# Patient Record
Sex: FEMALE | Race: WHITE | Employment: OTHER | URBAN - METROPOLITAN AREA
[De-identification: names, ages, dates, MRNs, and addresses within clinical notes are randomized per-mention and may not be internally consistent; named-entity substitution may affect disease eponyms.]

---

## 2017-03-13 ENCOUNTER — OFFICE VISIT (OUTPATIENT)
Dept: FAMILY MEDICINE CLINIC | Age: 82
End: 2017-03-13
Payer: MEDICARE

## 2017-03-13 VITALS
HEART RATE: 76 BPM | SYSTOLIC BLOOD PRESSURE: 110 MMHG | HEIGHT: 61 IN | DIASTOLIC BLOOD PRESSURE: 62 MMHG | WEIGHT: 107 LBS | BODY MASS INDEX: 20.2 KG/M2

## 2017-03-13 DIAGNOSIS — H61.21 HEARING LOSS DUE TO CERUMEN IMPACTION, RIGHT: ICD-10-CM

## 2017-03-13 DIAGNOSIS — I10 ESSENTIAL HYPERTENSION: Primary | ICD-10-CM

## 2017-03-13 PROCEDURE — G8484 FLU IMMUNIZE NO ADMIN: HCPCS | Performed by: FAMILY MEDICINE

## 2017-03-13 PROCEDURE — 1036F TOBACCO NON-USER: CPT | Performed by: FAMILY MEDICINE

## 2017-03-13 PROCEDURE — G8427 DOCREV CUR MEDS BY ELIG CLIN: HCPCS | Performed by: FAMILY MEDICINE

## 2017-03-13 PROCEDURE — G8419 CALC BMI OUT NRM PARAM NOF/U: HCPCS | Performed by: FAMILY MEDICINE

## 2017-03-13 PROCEDURE — 99213 OFFICE O/P EST LOW 20 MIN: CPT | Performed by: FAMILY MEDICINE

## 2017-03-13 PROCEDURE — G8400 PT W/DXA NO RESULTS DOC: HCPCS | Performed by: FAMILY MEDICINE

## 2017-03-13 PROCEDURE — 1123F ACP DISCUSS/DSCN MKR DOCD: CPT | Performed by: FAMILY MEDICINE

## 2017-03-13 PROCEDURE — 69210 REMOVE IMPACTED EAR WAX UNI: CPT | Performed by: FAMILY MEDICINE

## 2017-03-13 PROCEDURE — 4040F PNEUMOC VAC/ADMIN/RCVD: CPT | Performed by: FAMILY MEDICINE

## 2017-03-13 PROCEDURE — 1090F PRES/ABSN URINE INCON ASSESS: CPT | Performed by: FAMILY MEDICINE

## 2017-03-13 RX ORDER — AMLODIPINE BESYLATE 5 MG/1
TABLET ORAL
Qty: 90 TABLET | Refills: 1 | Status: SHIPPED | OUTPATIENT
Start: 2017-03-13 | End: 2017-08-08 | Stop reason: SDUPTHER

## 2017-03-13 ASSESSMENT — ENCOUNTER SYMPTOMS
COUGH: 0
RHINORRHEA: 0
DIARRHEA: 0
CONSTIPATION: 0
SHORTNESS OF BREATH: 0
ABDOMINAL PAIN: 0
VOMITING: 0
WHEEZING: 0
BLOOD IN STOOL: 0
SORE THROAT: 0
NAUSEA: 0
TROUBLE SWALLOWING: 0

## 2017-08-08 ENCOUNTER — OFFICE VISIT (OUTPATIENT)
Dept: FAMILY MEDICINE CLINIC | Age: 82
End: 2017-08-08
Payer: MEDICARE

## 2017-08-08 VITALS
OXYGEN SATURATION: 97 % | HEIGHT: 61 IN | WEIGHT: 110 LBS | DIASTOLIC BLOOD PRESSURE: 68 MMHG | HEART RATE: 80 BPM | SYSTOLIC BLOOD PRESSURE: 105 MMHG | BODY MASS INDEX: 20.77 KG/M2 | RESPIRATION RATE: 18 BRPM

## 2017-08-08 DIAGNOSIS — I10 ESSENTIAL HYPERTENSION: Primary | ICD-10-CM

## 2017-08-08 PROCEDURE — 1090F PRES/ABSN URINE INCON ASSESS: CPT | Performed by: NURSE PRACTITIONER

## 2017-08-08 PROCEDURE — 1036F TOBACCO NON-USER: CPT | Performed by: NURSE PRACTITIONER

## 2017-08-08 PROCEDURE — G8400 PT W/DXA NO RESULTS DOC: HCPCS | Performed by: NURSE PRACTITIONER

## 2017-08-08 PROCEDURE — G8427 DOCREV CUR MEDS BY ELIG CLIN: HCPCS | Performed by: NURSE PRACTITIONER

## 2017-08-08 PROCEDURE — 4040F PNEUMOC VAC/ADMIN/RCVD: CPT | Performed by: NURSE PRACTITIONER

## 2017-08-08 PROCEDURE — 1123F ACP DISCUSS/DSCN MKR DOCD: CPT | Performed by: NURSE PRACTITIONER

## 2017-08-08 PROCEDURE — G8420 CALC BMI NORM PARAMETERS: HCPCS | Performed by: NURSE PRACTITIONER

## 2017-08-08 PROCEDURE — 99213 OFFICE O/P EST LOW 20 MIN: CPT | Performed by: NURSE PRACTITIONER

## 2017-08-08 RX ORDER — AMLODIPINE BESYLATE 5 MG/1
TABLET ORAL
Qty: 90 TABLET | Refills: 1 | Status: SHIPPED | OUTPATIENT
Start: 2017-08-08 | End: 2018-03-10 | Stop reason: SDUPTHER

## 2017-08-08 ASSESSMENT — ENCOUNTER SYMPTOMS
CONSTIPATION: 0
DIARRHEA: 0
SINUS PRESSURE: 0
BLOOD IN STOOL: 0
ABDOMINAL PAIN: 0
RHINORRHEA: 0
COUGH: 0
SHORTNESS OF BREATH: 0
EYE DISCHARGE: 0
CHEST TIGHTNESS: 0

## 2017-08-08 ASSESSMENT — PATIENT HEALTH QUESTIONNAIRE - PHQ9
SUM OF ALL RESPONSES TO PHQ9 QUESTIONS 1 & 2: 0
SUM OF ALL RESPONSES TO PHQ QUESTIONS 1-9: 0
2. FEELING DOWN, DEPRESSED OR HOPELESS: 0
1. LITTLE INTEREST OR PLEASURE IN DOING THINGS: 0

## 2017-11-20 ENCOUNTER — OFFICE VISIT (OUTPATIENT)
Dept: FAMILY MEDICINE CLINIC | Age: 82
End: 2017-11-20
Payer: MEDICARE

## 2017-11-20 VITALS
OXYGEN SATURATION: 98 % | HEART RATE: 76 BPM | HEIGHT: 61 IN | DIASTOLIC BLOOD PRESSURE: 78 MMHG | WEIGHT: 108 LBS | SYSTOLIC BLOOD PRESSURE: 130 MMHG | TEMPERATURE: 97 F | BODY MASS INDEX: 20.39 KG/M2 | RESPIRATION RATE: 20 BRPM

## 2017-11-20 DIAGNOSIS — Z00.00 ROUTINE GENERAL MEDICAL EXAMINATION AT A HEALTH CARE FACILITY: Primary | ICD-10-CM

## 2017-11-20 PROCEDURE — G0438 PPPS, INITIAL VISIT: HCPCS | Performed by: NURSE PRACTITIONER

## 2017-11-20 ASSESSMENT — LIFESTYLE VARIABLES: HOW OFTEN DO YOU HAVE A DRINK CONTAINING ALCOHOL: 0

## 2017-11-20 ASSESSMENT — PATIENT HEALTH QUESTIONNAIRE - PHQ9: SUM OF ALL RESPONSES TO PHQ QUESTIONS 1-9: 0

## 2017-11-20 ASSESSMENT — ANXIETY QUESTIONNAIRES: GAD7 TOTAL SCORE: 0

## 2017-11-20 NOTE — PATIENT INSTRUCTIONS
Personalized Preventive Plan for Adilia Gunn - 11/20/2017  Medicare offers a range of preventive health benefits. Some of the tests and screenings are paid in full while other may be subject to a deductible, co-insurance, and/or copay. Some of these benefits include a comprehensive review of your medical history including lifestyle, illnesses that may run in your family, and various assessments and screenings as appropriate. After reviewing your medical record and screening and assessments performed today your provider may have ordered immunizations, labs, imaging, and/or referrals for you. A list of these orders (if applicable) as well as your Preventive Care list are included within your After Visit Summary for your review. Other Preventive Recommendations:    · A preventive eye exam performed by an eye specialist is recommended every 1-2 years to screen for glaucoma; cataracts, macular degeneration, and other eye disorders. · A preventive dental visit is recommended every 6 months. · Try to get at least 150 minutes of exercise per week or 10,000 steps per day on a pedometer . · Order or download the FREE \"Exercise & Physical Activity: Your Everyday Guide\" from [employer]. Call 5-958.457.5185 or search [employer] online. · You need 9716-6208 mg of calcium and 3051-7387 IU of vitamin D per day. It is possible to meet your calcium requirement with diet alone, but a vitamin D supplement is usually necessary to meet this goal.  · When exposed to the sun, use a sunscreen that protects against both UVA and UVB radiation with an SPF of 30 or greater. Reapply every 2 to 3 hours or after sweating, drying off with a towel, or swimming. · Always wear a seat belt when traveling in a car. Always wear a helmet when riding a bicycle or motorcycle. Personalized Preventive Plan for Adilia Gunn - 11/20/2017  Medicare offers a range of preventive health benefits. Some of the tests and screenings are paid in full while other may be subject to a deductible, co-insurance, and/or copay. Some of these benefits include a comprehensive review of your medical history including lifestyle, illnesses that may run in your family, and various assessments and screenings as appropriate. After reviewing your medical record and screening and assessments performed today your provider may have ordered immunizations, labs, imaging, and/or referrals for you. A list of these orders (if applicable) as well as your Preventive Care list are included within your After Visit Summary for your review. Other Preventive Recommendations:    · A preventive eye exam performed by an eye specialist is recommended every 1-2 years to screen for glaucoma; cataracts, macular degeneration, and other eye disorders. · A preventive dental visit is recommended every 6 months. · Try to get at least 150 minutes of exercise per week or 10,000 steps per day on a pedometer . · Order or download the FREE \"Exercise & Physical Activity: Your Everyday Guide\" from [employer]. Call 6-700.878.2919 or search [employer] online. · You need 1906-0048 mg of calcium and 3427-6552 IU of vitamin D per day. It is possible to meet your calcium requirement with diet alone, but a vitamin D supplement is usually necessary to meet this goal.  · When exposed to the sun, use a sunscreen that protects against both UVA and UVB radiation with an SPF of 30 or greater. Reapply every 2 to 3 hours or after sweating, drying off with a towel, or swimming. · Always wear a seat belt when traveling in a car. Always wear a helmet when riding a bicycle or motorcycle. Personalized Preventive Plan for Khadar Sheth - 11/20/2017  Medicare offers a range of preventive health benefits.  Some of the tests and screenings are paid in full while other may be subject to a deductible, co-insurance, and/or

## 2017-11-20 NOTE — PROGRESS NOTES
Medicare Annual Wellness Visit  Name: Solomon Antoine Date: 2017   MRN: [id number] Sex: Female   Age: 81 y.o. Ethnicity: Non-/Non    : 1935 Race: Portia Bello is here for Medicare AWV    Screenings for behavioral, psychosocial and functional/safety risks, and cognitive dysfunction are all negative except as indicated below. These results, as well as other patient data from the [address] form, are documented in Flowsheets linked to this Encounter. No Known Allergies    Prior to Visit Medications    Medication Sig Taking? Authorizing Provider   amLODIPine (NORVASC) 5 MG tablet take 1 tablet by mouth once daily Yes Jose Trujillo CNP   timolol (TIMOPTIC) 0.5 % ophthalmic solution As directed Yes Yary Hernandez DO       Past Medical History:   Diagnosis Date    Hypertension     Unspecified essential hypertension 10/5/2012     History reviewed. No pertinent surgical history. Family History   Problem Relation Age of Onset    Heart Disease Mother     Heart Disease Father     Mult Sclerosis Sister        CareTeam (Including outside providers/suppliers regularly involved in providing care):   Patient Care Team:  Jose Trujillo CNP as PCP - General (Certified Nurse Practitioner)  Jose Trujillo CNP as PCP - [facility] Attributed Provider    Wt Readings from Last 3 Encounters:   17 108 lb (49 kg)   17 110 lb (49.9 kg)   17 107 lb (48.5 kg)     Vitals:    17 0856   BP: 130/78   Site: Left Arm   Position: Sitting   Cuff Size: Small Adult   Pulse: 76   Resp: 20   Temp: 97 °F (36.1 °C)   TempSrc: Oral   SpO2: 98%   Weight: 108 lb (49 kg)   Height: 5' 1.02\" (1.55 m)       Patient's complete Health Risk Assessment and screening values have been reviewed and are found in Flowsheets. The following problems were reviewed today and where indicated follow up appointments were made and/or referrals ordered.     Positive Risk Factor Screenings with Interventions:     Health Habits/Nutrition:  Health Habits/Nutrition  Do you exercise for at least 20 minutes 2-3 times per week?: (!) No  Have you lost any weight without trying in the past 3 months?: No  Do you eat fewer than 2 meals per day?: No  Have you seen a dentist within the past year?: Yes  Body mass index is 20.39 kg/m². Health Habits/Nutrition Interventions:  · None indicated   · Increase activity      Personalized Preventive Plan   Current Health Maintenance Status    There is no immunization history on file for this patient. Refuses any immunizations at this time    Health Maintenance   Topic Date Due    Pneumococcal low/med risk (1 of 2 - PCV13) 12/12/2018 (Originally 4/29/2001)    DTaP/Tdap/Td vaccine (1 - Tdap) 09/09/2026 (Originally 4/29/1955)    Flu vaccine (1) 09/09/2026 (Originally 9/1/2017)    Zostavax vaccine  Addressed    DEXA (modify frequency per FRAX score)  Addressed     Recommendations for Preventive Services Due: see orders. Recommended screening schedule for the next 5-10 years is provided to the patient in written form: see Patient Instructions/AVS.  Cardiovascular Disease Risk Counseling: Assessed the patient's risk to develop cardiovascular disease and reviewed main risk factors. Reviewed steps to reduce disease risk including:   · Quitting tobacco use, reducing amount smoked, or not starting the habit  · Making healthy food choices  · Being physically active and gradualy increasing activity levels   · Reduce weight and determine a healthy BMI goal  · Monitor blood pressure and treat if higher than 140/90 mmHg  · Maintain blood total cholesterol levels under 5 mmol/l or 190 mg/dl  · Maintain LDL cholesterol levels under 3.0 mmol/l or 115 mg/dl   · Control blood glucose levels  · Consider taking aspirin (75 mg daily), once blood pressure is controlled   Provided a follow up plan.   Time spent (minutes): 35

## 2018-03-13 RX ORDER — AMLODIPINE BESYLATE 5 MG/1
TABLET ORAL
Qty: 90 TABLET | Refills: 1 | Status: SHIPPED | OUTPATIENT
Start: 2018-03-13 | End: 2018-09-08 | Stop reason: SDUPTHER

## 2018-09-10 RX ORDER — AMLODIPINE BESYLATE 5 MG/1
TABLET ORAL
Qty: 90 TABLET | Refills: 1 | Status: SHIPPED | OUTPATIENT
Start: 2018-09-10 | End: 2019-03-05 | Stop reason: SDUPTHER

## 2018-09-10 NOTE — TELEPHONE ENCOUNTER
Last visit: 11/20/17  Last Med refill: 3/13/18    Next Visit Date:  Future Appointments  Date Time Provider Clarice Dawson   11/20/2018 8:00 AM SANDER Fragoso CNP [facility] [address] Maintenance   Topic Date Due    Potassium monitoring  1935    Creatinine monitoring  1935    Shingles Vaccine (1 of 2 - 2 Dose Series) 04/29/1986    Pneumococcal low/med risk (1 of 2 - PCV13) 12/12/2018 (Originally 4/29/2001)    DTaP/Tdap/Td vaccine (1 - Tdap) 09/09/2026 (Originally 4/29/1955)    Flu vaccine (1) 09/09/2026 (Originally 9/1/2018)    DEXA (modify frequency per FRAX score)  Addressed       No results found for: LABA1C          ( goal A1C is < 7)   No results found for: LABMICR  No results found for: LDLCHOLESTEROL, LDLCALC    (goal LDL is <100)   No results found for: AST, ALT, BUN  BP Readings from Last 3 Encounters:   11/20/17 130/78   08/08/17 105/68   03/13/17 110/62          (goal 120/80)    All Future Testing planned in [facility]  Lab Frequency Next Occurrence               Patient Active Problem List:     Essential hypertension     HTN (hypertension)

## 2018-11-20 ENCOUNTER — OFFICE VISIT (OUTPATIENT)
Dept: FAMILY MEDICINE CLINIC | Age: 83
End: 2018-11-20
Payer: MEDICARE

## 2018-11-20 VITALS
SYSTOLIC BLOOD PRESSURE: 110 MMHG | HEART RATE: 76 BPM | HEIGHT: 61 IN | DIASTOLIC BLOOD PRESSURE: 68 MMHG | TEMPERATURE: 97 F | WEIGHT: 105.4 LBS | OXYGEN SATURATION: 100 % | BODY MASS INDEX: 19.9 KG/M2

## 2018-11-20 DIAGNOSIS — Z00.00 ROUTINE GENERAL MEDICAL EXAMINATION AT A HEALTH CARE FACILITY: Primary | ICD-10-CM

## 2018-11-20 PROCEDURE — G8484 FLU IMMUNIZE NO ADMIN: HCPCS | Performed by: NURSE PRACTITIONER

## 2018-11-20 PROCEDURE — G0439 PPPS, SUBSEQ VISIT: HCPCS | Performed by: NURSE PRACTITIONER

## 2018-11-20 PROCEDURE — 4040F PNEUMOC VAC/ADMIN/RCVD: CPT | Performed by: NURSE PRACTITIONER

## 2018-11-20 ASSESSMENT — ANXIETY QUESTIONNAIRES: GAD7 TOTAL SCORE: 6

## 2018-11-20 ASSESSMENT — LIFESTYLE VARIABLES: HOW OFTEN DO YOU HAVE A DRINK CONTAINING ALCOHOL: 0

## 2018-11-20 ASSESSMENT — PATIENT HEALTH QUESTIONNAIRE - PHQ9
SUM OF ALL RESPONSES TO PHQ QUESTIONS 1-9: 2
SUM OF ALL RESPONSES TO PHQ QUESTIONS 1-9: 2

## 2018-11-20 NOTE — PROGRESS NOTES
Medicare Annual Wellness Visit  Name: Evangelina Santana Date: 2018   MRN: [id number] Sex: Female   Age: 81 y.o. Ethnicity: Non-/Non    : 1935 Race: Sylvia Martinez is here for Annual Exam (Medicare annual well visit)    Screenings for behavioral, psychosocial and functional/safety risks, and cognitive dysfunction are all negative except as indicated below. These results, as well as other patient data from the [address] form, are documented in Flowsheets linked to this Encounter. Pt refuses blood work. Refuses immunization    No Known Allergies    Prior to Visit Medications    Medication Sig Taking? Authorizing Provider   Multiple Vitamins-Minerals (EYE VITAMINS & MINERALS PO) Take by mouth 2 times daily Yes Historical Provider, MD   amLODIPine (NORVASC) 5 MG tablet take 1 tablet by mouth once daily Yes Thea Gowers, APRN - CNP   timolol (TIMOPTIC) 0.5 % ophthalmic solution As directed Yes Malini Martinez DO       Past Medical History:   Diagnosis Date    Hypertension     Unspecified essential hypertension 10/5/2012     No past surgical history on file. Family History   Problem Relation Age of Onset    Heart Disease Mother     Heart Disease Father     Mult Sclerosis Sister        CareTeam (Including outside providers/suppliers regularly involved in providing care):   Patient Care Team:  Thea Gowers, APRN - CNP as PCP - General (Certified Nurse Practitioner)  Thea Gowers, APRN - CNP as PCP - [facility] Attributed Provider    Wt Readings from Last 3 Encounters:   18 105 lb 6.4 oz (47.8 kg)   17 108 lb (49 kg)   17 110 lb (49.9 kg)     Vitals:    18 0808   BP: 110/68   Site: Left Upper Arm   Position: Standing   Cuff Size: Small Adult   Pulse: 76   Temp: 97 °F (36.1 °C)   TempSrc: Oral   SpO2: 100%   Weight: 105 lb 6.4 oz (47.8 kg)   Height: 5' 1\" (1.549 m)     Body mass index is 19.92 kg/m².     Patient's Yes

## 2018-11-20 NOTE — PATIENT INSTRUCTIONS
performed today your provider may have ordered immunizations, labs, imaging, and/or referrals for you. A list of these orders (if applicable) as well as your Preventive Care list are included within your After Visit Summary for your review. Other Preventive Recommendations:    · A preventive eye exam performed by an eye specialist is recommended every 1-2 years to screen for glaucoma; cataracts, macular degeneration, and other eye disorders. · A preventive dental visit is recommended every 6 months. · Try to get at least 150 minutes of exercise per week or 10,000 steps per day on a pedometer . · Order or download the FREE \"Exercise & Physical Activity: Your Everyday Guide\" from [employer]. Call 0-479.423.2554 or search [employer] online. · You need 4579-5066 mg of calcium and 5871-4286 IU of vitamin D per day. It is possible to meet your calcium requirement with diet alone, but a vitamin D supplement is usually necessary to meet this goal.  · When exposed to the sun, use a sunscreen that protects against both UVA and UVB radiation with an SPF of 30 or greater. Reapply every 2 to 3 hours or after sweating, drying off with a towel, or swimming. · Always wear a seat belt when traveling in a car. Always wear a helmet when riding a bicycle or motorcycle.

## 2019-03-05 RX ORDER — AMLODIPINE BESYLATE 5 MG/1
TABLET ORAL
Qty: 90 TABLET | Refills: 1 | Status: SHIPPED | OUTPATIENT
Start: 2019-03-05 | End: 2019-07-23 | Stop reason: SDUPTHER

## 2019-07-23 ENCOUNTER — OFFICE VISIT (OUTPATIENT)
Dept: FAMILY MEDICINE CLINIC | Age: 84
End: 2019-07-23
Payer: MEDICARE

## 2019-07-23 VITALS
RESPIRATION RATE: 20 BRPM | WEIGHT: 108 LBS | TEMPERATURE: 98 F | BODY MASS INDEX: 20.39 KG/M2 | DIASTOLIC BLOOD PRESSURE: 66 MMHG | SYSTOLIC BLOOD PRESSURE: 117 MMHG | HEIGHT: 61 IN | HEART RATE: 68 BPM

## 2019-07-23 DIAGNOSIS — H25.9 AGE-RELATED CATARACT OF BOTH EYES, UNSPECIFIED AGE-RELATED CATARACT TYPE: ICD-10-CM

## 2019-07-23 DIAGNOSIS — I10 ESSENTIAL HYPERTENSION: Primary | ICD-10-CM

## 2019-07-23 PROCEDURE — G8420 CALC BMI NORM PARAMETERS: HCPCS | Performed by: INTERNAL MEDICINE

## 2019-07-23 PROCEDURE — 1090F PRES/ABSN URINE INCON ASSESS: CPT | Performed by: INTERNAL MEDICINE

## 2019-07-23 PROCEDURE — 4040F PNEUMOC VAC/ADMIN/RCVD: CPT | Performed by: INTERNAL MEDICINE

## 2019-07-23 PROCEDURE — G8400 PT W/DXA NO RESULTS DOC: HCPCS | Performed by: INTERNAL MEDICINE

## 2019-07-23 PROCEDURE — 1036F TOBACCO NON-USER: CPT | Performed by: INTERNAL MEDICINE

## 2019-07-23 PROCEDURE — G8427 DOCREV CUR MEDS BY ELIG CLIN: HCPCS | Performed by: INTERNAL MEDICINE

## 2019-07-23 PROCEDURE — 1123F ACP DISCUSS/DSCN MKR DOCD: CPT | Performed by: INTERNAL MEDICINE

## 2019-07-23 PROCEDURE — 99213 OFFICE O/P EST LOW 20 MIN: CPT | Performed by: INTERNAL MEDICINE

## 2019-07-23 RX ORDER — AMLODIPINE BESYLATE 5 MG/1
TABLET ORAL
Qty: 90 TABLET | Refills: 1 | Status: SHIPPED | OUTPATIENT
Start: 2019-07-23 | End: 2020-02-26

## 2019-07-23 ASSESSMENT — PATIENT HEALTH QUESTIONNAIRE - PHQ9
1. LITTLE INTEREST OR PLEASURE IN DOING THINGS: 0
SUM OF ALL RESPONSES TO PHQ QUESTIONS 1-9: 0
SUM OF ALL RESPONSES TO PHQ9 QUESTIONS 1 & 2: 0
2. FEELING DOWN, DEPRESSED OR HOPELESS: 0
SUM OF ALL RESPONSES TO PHQ QUESTIONS 1-9: 0

## 2019-07-23 NOTE — PROGRESS NOTES
Subjective:       Patient ID: Percy Feliciano is a 81 y.o. female who presents for   Chief Complaint   Patient presents with    Hypertension     follow up and no new concerns        HPI:  Nursing note reviewed and discussed with patient. Hypertension  Patient is here for follow-up of elevated blood pressure. She is exercising and is adherent to a low-salt diet. Blood pressure is well controlled at home. Cardiac symptoms: none. Patient denies chest pain, chest pressure/discomfort, claudication, dyspnea, exertional chest pressure/discomfort, fatigue, irregular heart beat, lower extremity edema, near-syncope, orthopnea, palpitations, paroxysmal nocturnal dyspnea, syncope and tachypnea. Cardiovascular risk factors: advanced age (older than 54 for men, 72 for women) and hypertension. Use of agents associated with hypertension: none. History of target organ damage: none. Patient's medications, allergies, past medical, surgical, social and family histories were reviewed and updated as appropriate. Social History     Tobacco Use    Smoking status: Never Smoker    Smokeless tobacco: Never Used   Substance Use Topics    Alcohol use: No        Review of Systems  Energy level good overall, and weight is stable. No chest pain or shortness of breath. Bowels have been normal without constipation or diarrhea         Objective:        Physical Exam:  /66   Pulse 68   Temp 98 °F (36.7 °C) (Oral)   Resp 20   Ht 5' 1\" (1.549 m)   Wt 108 lb (49 kg)   BMI 20.41 kg/m²     General: Alert and oriented, in no distress. Patient ambulating with normal gait. Normal body habitus. Chest: clear with no wheezes or rales. No retractions, or use of accessory muscles noted. Cardiovascular: PMI is not displaced, and no thrill noted. Regular rate and rhythm with no rub, murmur or gallop. There is no peripheral edema. Pedal pulses are normal.   Abdomen: Abdomen is soft and nontender.  The bowel sounds are normal.

## 2019-08-16 ENCOUNTER — OFFICE VISIT (OUTPATIENT)
Dept: FAMILY MEDICINE CLINIC | Age: 84
End: 2019-08-16
Payer: MEDICARE

## 2019-08-16 VITALS
TEMPERATURE: 97.3 F | HEART RATE: 79 BPM | WEIGHT: 105 LBS | OXYGEN SATURATION: 97 % | BODY MASS INDEX: 19.83 KG/M2 | HEIGHT: 61 IN | DIASTOLIC BLOOD PRESSURE: 82 MMHG | SYSTOLIC BLOOD PRESSURE: 128 MMHG

## 2019-08-16 DIAGNOSIS — R60.0 FACIAL EDEMA: ICD-10-CM

## 2019-08-16 DIAGNOSIS — T65.91XA ALLERGIC REACTION TO CHEMICAL SUBSTANCE, ACCIDENTAL OR UNINTENTIONAL, INITIAL ENCOUNTER: Primary | ICD-10-CM

## 2019-08-16 PROCEDURE — 1036F TOBACCO NON-USER: CPT | Performed by: INTERNAL MEDICINE

## 2019-08-16 PROCEDURE — 4040F PNEUMOC VAC/ADMIN/RCVD: CPT | Performed by: INTERNAL MEDICINE

## 2019-08-16 PROCEDURE — G8427 DOCREV CUR MEDS BY ELIG CLIN: HCPCS | Performed by: INTERNAL MEDICINE

## 2019-08-16 PROCEDURE — G8420 CALC BMI NORM PARAMETERS: HCPCS | Performed by: INTERNAL MEDICINE

## 2019-08-16 PROCEDURE — G8400 PT W/DXA NO RESULTS DOC: HCPCS | Performed by: INTERNAL MEDICINE

## 2019-08-16 PROCEDURE — 1090F PRES/ABSN URINE INCON ASSESS: CPT | Performed by: INTERNAL MEDICINE

## 2019-08-16 PROCEDURE — 99213 OFFICE O/P EST LOW 20 MIN: CPT | Performed by: INTERNAL MEDICINE

## 2019-08-16 PROCEDURE — 1123F ACP DISCUSS/DSCN MKR DOCD: CPT | Performed by: INTERNAL MEDICINE

## 2019-08-16 RX ORDER — METHYLPREDNISOLONE 4 MG/1
TABLET ORAL
Qty: 1 KIT | Refills: 0 | Status: SHIPPED | OUTPATIENT
Start: 2019-08-16 | End: 2019-11-26 | Stop reason: CLARIF

## 2019-08-16 RX ORDER — RANITIDINE 150 MG/1
150 TABLET ORAL 2 TIMES DAILY
Qty: 28 TABLET | Refills: 0 | Status: SHIPPED | OUTPATIENT
Start: 2019-08-16 | End: 2020-09-02 | Stop reason: RX

## 2019-08-16 RX ORDER — CETIRIZINE HYDROCHLORIDE 5 MG/1
5 TABLET ORAL DAILY
Qty: 14 TABLET | Refills: 0 | Status: SHIPPED | OUTPATIENT
Start: 2019-08-16 | End: 2019-08-30

## 2019-08-16 ASSESSMENT — ENCOUNTER SYMPTOMS
DIARRHEA: 0
RHINORRHEA: 0
SHORTNESS OF BREATH: 0
SORE THROAT: 0
EYE PAIN: 0
VOMITING: 0
NAIL CHANGES: 0
COUGH: 0

## 2019-08-16 NOTE — PROGRESS NOTES
Visit Information    Have you changed or started any medications since your last visit including any over-the-counter medicines, vitamins, or herbal medicines? no   Are you having any side effects from any of your medications? -  no  Have you stopped taking any of your medications? Is so, why? -  no    Have you seen any other physician or provider since your last visit? No  Have you had any other diagnostic tests since your last visit? No  Have you been seen in the emergency room and/or had an admission to a hospital since we last saw you? No  Have you had your routine dental cleaning in the past 6 months? yes     Have you activated your [employer] account? If not, what are your barriers?  No: declined     Patient Care Team:  Lissett Best MD as PCP - General (Internal Medicine)  Lissett Best MD as PCP - [facility]    Medical History Review  Past Medical, Family, and Social History reviewed and does not contribute to the patient presenting condition    Health Maintenance   Topic Date Due    Potassium monitoring  1935    Creatinine monitoring  1935    Pneumococcal 65+ years Vaccine (1 of 2 - PCV13) 04/29/2001    Shingles Vaccine (1 of 2) 11/20/2019 (Originally 4/29/1986)    DTaP/Tdap/Td vaccine (1 - Tdap) 09/09/2026 (Originally 4/29/1955)    Flu vaccine (1) 09/09/2026 (Originally 9/1/2019)    Annual Wellness Visit (AWV)  11/20/2019    DEXA (modify frequency per FRAX score)  Addressed
Current Outpatient Medications   Medication Sig Dispense Refill    amLODIPine (NORVASC) 5 MG tablet take 1 tablet by mouth once daily 90 tablet 1    Multiple Vitamins-Minerals (EYE VITAMINS & MINERALS PO) Take by mouth 2 times daily      timolol (TIMOPTIC) 0.5 % ophthalmic solution As directed  0     No current facility-administered medications for this visit. No Known Allergies    Health Maintenance   Topic Date Due    Potassium monitoring  1935    Creatinine monitoring  1935    Pneumococcal 65+ years Vaccine (1 of 2 - PCV13) 04/29/2001    Shingles Vaccine (1 of 2) 11/20/2019 (Originally 4/29/1986)    DTaP/Tdap/Td vaccine (1 - Tdap) 09/09/2026 (Originally 4/29/1955)    Flu vaccine (1) 09/09/2026 (Originally 9/1/2019)    Annual Wellness Visit (AWV)  11/20/2019    DEXA (modify frequency per FRAX score)  Addressed          Review of Systems   Constitutional: Negative for fatigue and fever. HENT: Negative for congestion, rhinorrhea and sore throat. Eyes: Negative for pain. Respiratory: Negative for cough and shortness of breath. Gastrointestinal: Negative for anorexia, diarrhea and vomiting. Musculoskeletal: Negative for joint pain. Skin: Positive for rash. Negative for nail changes. All other systems reviewed and are negative. Objective:     /82   Pulse 79   Temp 97.3 °F (36.3 °C) (Oral)   Ht 5' 0.98\" (1.549 m)   Wt 105 lb (47.6 kg)   SpO2 97%   BMI 19.85 kg/m²   Physical Exam   Constitutional: She appears well-developed and well-nourished. No distress. HENT:   Head:       Erythema, swelling and pruritus of marked area    Cardiovascular: Normal rate, regular rhythm and normal heart sounds. Exam reveals no gallop and no friction rub. No murmur heard. Pulmonary/Chest: Effort normal. No respiratory distress. She has wheezes. Abdominal: Soft. Bowel sounds are normal. She exhibits no mass. There is no tenderness. There is no guarding.    Musculoskeletal:

## 2019-11-26 ENCOUNTER — OFFICE VISIT (OUTPATIENT)
Dept: FAMILY MEDICINE CLINIC | Age: 84
End: 2019-11-26
Payer: MEDICARE

## 2019-11-26 VITALS
SYSTOLIC BLOOD PRESSURE: 110 MMHG | OXYGEN SATURATION: 100 % | HEIGHT: 61 IN | BODY MASS INDEX: 20.73 KG/M2 | HEART RATE: 67 BPM | WEIGHT: 109.8 LBS | DIASTOLIC BLOOD PRESSURE: 78 MMHG

## 2019-11-26 DIAGNOSIS — Z28.21 VACCINATION NOT CARRIED OUT BECAUSE OF PATIENT REFUSAL: ICD-10-CM

## 2019-11-26 DIAGNOSIS — Z00.00 ROUTINE GENERAL MEDICAL EXAMINATION AT A HEALTH CARE FACILITY: ICD-10-CM

## 2019-11-26 PROCEDURE — 1123F ACP DISCUSS/DSCN MKR DOCD: CPT | Performed by: INTERNAL MEDICINE

## 2019-11-26 PROCEDURE — G0439 PPPS, SUBSEQ VISIT: HCPCS | Performed by: INTERNAL MEDICINE

## 2019-11-26 PROCEDURE — G8484 FLU IMMUNIZE NO ADMIN: HCPCS | Performed by: INTERNAL MEDICINE

## 2019-11-26 PROCEDURE — 4040F PNEUMOC VAC/ADMIN/RCVD: CPT | Performed by: INTERNAL MEDICINE

## 2019-11-26 ASSESSMENT — PATIENT HEALTH QUESTIONNAIRE - PHQ9
SUM OF ALL RESPONSES TO PHQ QUESTIONS 1-9: 0
SUM OF ALL RESPONSES TO PHQ QUESTIONS 1-9: 0

## 2020-02-26 RX ORDER — AMLODIPINE BESYLATE 5 MG/1
TABLET ORAL
Qty: 90 TABLET | Refills: 1 | Status: SHIPPED | OUTPATIENT
Start: 2020-02-26 | End: 2020-08-21

## 2020-05-28 RX ORDER — AMLODIPINE BESYLATE 5 MG/1
TABLET ORAL
Qty: 90 TABLET | Refills: 1 | OUTPATIENT
Start: 2020-05-28

## 2020-06-22 ENCOUNTER — OFFICE VISIT (OUTPATIENT)
Dept: FAMILY MEDICINE CLINIC | Age: 85
End: 2020-06-22
Payer: MEDICARE

## 2020-06-22 VITALS
DIASTOLIC BLOOD PRESSURE: 70 MMHG | WEIGHT: 111.4 LBS | HEART RATE: 78 BPM | TEMPERATURE: 97.1 F | SYSTOLIC BLOOD PRESSURE: 130 MMHG | BODY MASS INDEX: 21.06 KG/M2 | OXYGEN SATURATION: 98 %

## 2020-06-22 PROCEDURE — 1036F TOBACCO NON-USER: CPT | Performed by: NURSE PRACTITIONER

## 2020-06-22 PROCEDURE — 99213 OFFICE O/P EST LOW 20 MIN: CPT | Performed by: NURSE PRACTITIONER

## 2020-06-22 PROCEDURE — 1090F PRES/ABSN URINE INCON ASSESS: CPT | Performed by: NURSE PRACTITIONER

## 2020-06-22 PROCEDURE — G8427 DOCREV CUR MEDS BY ELIG CLIN: HCPCS | Performed by: NURSE PRACTITIONER

## 2020-06-22 PROCEDURE — G8400 PT W/DXA NO RESULTS DOC: HCPCS | Performed by: NURSE PRACTITIONER

## 2020-06-22 PROCEDURE — 4040F PNEUMOC VAC/ADMIN/RCVD: CPT | Performed by: NURSE PRACTITIONER

## 2020-06-22 PROCEDURE — G8420 CALC BMI NORM PARAMETERS: HCPCS | Performed by: NURSE PRACTITIONER

## 2020-06-22 PROCEDURE — 1123F ACP DISCUSS/DSCN MKR DOCD: CPT | Performed by: NURSE PRACTITIONER

## 2020-06-22 ASSESSMENT — PATIENT HEALTH QUESTIONNAIRE - PHQ9
SUM OF ALL RESPONSES TO PHQ QUESTIONS 1-9: 0
1. LITTLE INTEREST OR PLEASURE IN DOING THINGS: 0
SUM OF ALL RESPONSES TO PHQ9 QUESTIONS 1 & 2: 0
2. FEELING DOWN, DEPRESSED OR HOPELESS: 0
SUM OF ALL RESPONSES TO PHQ QUESTIONS 1-9: 0

## 2020-06-22 ASSESSMENT — ENCOUNTER SYMPTOMS
SHORTNESS OF BREATH: 0
COUGH: 0

## 2020-06-22 NOTE — PROGRESS NOTES
[address] [place]  [phone]    Otto Morse is a 81 y.o. female who presents today for her  medical conditions/complaints as noted below. Otto Morse is c/o of Ear Fullness  . HPI:     HPI   Right ear fullness. Occurs randomly needs it flushed. No pain. No fever. Cant hear out of it. Nursing note reviewed and discussed with patient. Patient's medications, allergies, past medical, surgical, social and family histories were reviewed and updated asappropriate. Current Outpatient Medications   Medication Sig Dispense Refill    Multiple Vitamins-Minerals (EYE VITAMINS & MINERALS PO) Take by mouth 2 times daily      timolol (TIMOPTIC) 0.5 % ophthalmic solution As directed  0    amLODIPine (NORVASC) 5 MG tablet take 1 tablet by mouth once daily (Patient not taking: Reported on 6/22/2020) 90 tablet 1    ranitidine (ZANTAC) 150 MG tablet Take 1 tablet by mouth 2 times daily for 14 days (Patient not taking: Reported on 6/22/2020) 28 tablet 0     No current facility-administered medications for this visit. Past Medical History:   Diagnosis Date    Hypertension     Unspecified essential hypertension 10/5/2012      No past surgical history on file. Family History   Problem Relation Age of Onset    Heart Disease Mother     Heart Disease Father     Mult Sclerosis Sister      Social History     Tobacco Use    Smoking status: Never Smoker    Smokeless tobacco: Never Used   Substance Use Topics    Alcohol use: No      No Known Allergies    Subjective:      Review of Systems   Constitutional: Negative for chills and fever. Respiratory: Negative for cough and shortness of breath. Cardiovascular: Negative for chest pain, palpitations and leg swelling.      Other pertinent ROS in HPI  Objective:     /70 (Site: Left Upper Arm, Position: Sitting, Cuff Size: Medium Adult)   Pulse 78   Temp 97.1 °F (36.2 °C)   Wt 111 lb any errors but some words may be misinterpreted.     Electronically signed by Fabi Unger CNP on 6/22/2020 at 2:52 PM

## 2020-07-10 ENCOUNTER — OFFICE VISIT (OUTPATIENT)
Dept: FAMILY MEDICINE CLINIC | Age: 85
End: 2020-07-10
Payer: MEDICARE

## 2020-07-10 VITALS
HEART RATE: 70 BPM | OXYGEN SATURATION: 98 % | WEIGHT: 109.8 LBS | BODY MASS INDEX: 20.76 KG/M2 | DIASTOLIC BLOOD PRESSURE: 70 MMHG | TEMPERATURE: 96.5 F | SYSTOLIC BLOOD PRESSURE: 120 MMHG

## 2020-07-10 PROCEDURE — 1036F TOBACCO NON-USER: CPT | Performed by: NURSE PRACTITIONER

## 2020-07-10 PROCEDURE — 99213 OFFICE O/P EST LOW 20 MIN: CPT | Performed by: NURSE PRACTITIONER

## 2020-07-10 PROCEDURE — 1090F PRES/ABSN URINE INCON ASSESS: CPT | Performed by: NURSE PRACTITIONER

## 2020-07-10 PROCEDURE — G8427 DOCREV CUR MEDS BY ELIG CLIN: HCPCS | Performed by: NURSE PRACTITIONER

## 2020-07-10 PROCEDURE — G8400 PT W/DXA NO RESULTS DOC: HCPCS | Performed by: NURSE PRACTITIONER

## 2020-07-10 PROCEDURE — 1123F ACP DISCUSS/DSCN MKR DOCD: CPT | Performed by: NURSE PRACTITIONER

## 2020-07-10 PROCEDURE — G8420 CALC BMI NORM PARAMETERS: HCPCS | Performed by: NURSE PRACTITIONER

## 2020-07-10 PROCEDURE — 4040F PNEUMOC VAC/ADMIN/RCVD: CPT | Performed by: NURSE PRACTITIONER

## 2020-07-10 RX ORDER — CETIRIZINE HYDROCHLORIDE 10 MG/1
10 TABLET ORAL DAILY
Qty: 14 TABLET | Refills: 0 | Status: SHIPPED | OUTPATIENT
Start: 2020-07-10 | End: 2021-12-15 | Stop reason: ALTCHOICE

## 2020-07-10 RX ORDER — METHYLPREDNISOLONE 4 MG/1
TABLET ORAL
Qty: 1 KIT | Refills: 0 | Status: SHIPPED | OUTPATIENT
Start: 2020-07-10 | End: 2020-07-16

## 2020-07-10 ASSESSMENT — ENCOUNTER SYMPTOMS
RHINORRHEA: 0
SHORTNESS OF BREATH: 0
SORE THROAT: 0

## 2020-07-10 NOTE — PROGRESS NOTES
Patient is present complaining of rash x5 days  Patient states it is on her face, legs, and arms  States her eyes were swollen shut and her face is still swollen  Patient states she was [place] out [place] and has been working in her [place]

## 2020-07-10 NOTE — PROGRESS NOTES
surgical history on file. Family History   Problem Relation Age of Onset    Heart Disease Mother     Heart Disease Father     Mult Sclerosis Sister      Social History     Tobacco Use    Smoking status: Never Smoker    Smokeless tobacco: Never Used   Substance Use Topics    Alcohol use: No      No Known Allergies    Subjective:      Review of Systems   Constitutional: Negative for fever. HENT: Negative for rhinorrhea and sore throat. Respiratory: Negative for shortness of breath. Skin: Positive for rash. Other pertinent ROS in HPI  Objective:     /70 (Site: Left Upper Arm, Position: Sitting, Cuff Size: Medium Adult)   Pulse 70   Temp 96.5 °F (35.8 °C)   Wt 109 lb 12.8 oz (49.8 kg)   SpO2 98%   BMI 20.76 kg/m²    Physical Exam  Constitutional:       Appearance: She is well-developed. HENT:      Right Ear: External ear normal.      Left Ear: External ear normal.      Nose: Nose normal.   Neck:      Musculoskeletal: Full passive range of motion without pain and normal range of motion. Cardiovascular:      Rate and Rhythm: Normal rate and regular rhythm. Heart sounds: Normal heart sounds, S1 normal and S2 normal.   Pulmonary:      Effort: Pulmonary effort is normal. No respiratory distress. Breath sounds: Normal breath sounds. Musculoskeletal: Normal range of motion. General: No deformity. Skin:     General: Skin is warm and dry. Neurological:      Mental Status: She is alert and oriented to person, place, and time. Assessment/PLAN   1. Irritant dermatitis    - methylPREDNISolone (MEDROL, KELVIN,) 4 MG tablet; Take as directed  Dispense: 1 kit; Refill: 0  - cetirizine (ZYRTEC ALLERGY) 10 MG tablet; Take 1 tablet by mouth daily  Dispense: 14 tablet; Refill: 0      RTO if symptoms worsen or fail to improve  Pt agreeable with plan      Patient given educational materials - see patientinstructions.   Discussed use, benefit, and side effects of prescribed medications. All patient questions answered. Pt voiced understanding. Reviewed health maintenance. Instructed to continue current medications, diet andexercise. 1.  Jackie Garcia received counseling on the following healthy behaviors: medication adherence  2. Patient given educationalmaterials when available - see patient instructions when applicable  3. Discussed use, benefit, and side effects of prescribed medications. Barriersto medication compliance addressed. All patient questions answered. Pt voiced understanding. 4.  Reviewed prior labs and health maintenance when applicable. 5.  Continue current medications, diet and exercise. CompletedRefills   Requested Prescriptions     Signed Prescriptions Disp Refills    methylPREDNISolone (MEDROL, KELVIN,) 4 MG tablet 1 kit 0     Sig: Take as directed    cetirizine (ZYRTEC ALLERGY) 10 MG tablet 14 tablet 0     Sig: Take 1 tablet by mouth daily       This note was transcribed using dictation with Dragon services. Efforts were made to correct any errors but some words may be misinterpreted.     Electronically signed by Anne-Marie Maya CNP on 7/10/2020 at 12:05 PM

## 2020-08-21 RX ORDER — AMLODIPINE BESYLATE 5 MG/1
TABLET ORAL
Qty: 90 TABLET | Refills: 1 | Status: SHIPPED | OUTPATIENT
Start: 2020-08-21 | End: 2021-02-08

## 2020-08-21 NOTE — TELEPHONE ENCOUNTER
Last visit: 07/10/2020  Last Med refill: 05/23/2020  Does patient have enough medication for 72 hours: Yes    Next Visit Date:  No future appointments.     Health Maintenance   Topic Date Due    Pneumococcal 65+ years Vaccine (1 of 1 - PPSV23) 11/24/2020 (Originally 4/29/2001)    Shingles Vaccine (1 of 2) 11/26/2020 (Originally 4/29/1986)    Potassium monitoring  11/26/2020 (Originally 1935)    Creatinine monitoring  11/26/2020 (Originally 1935)    DTaP/Tdap/Td vaccine (1 - Tdap) 09/09/2026 (Originally 4/29/1955)    Flu vaccine (1) 09/09/2026 (Originally 9/1/2020)    Annual Wellness Visit (AWV)  11/26/2020    DEXA (modify frequency per FRAX score)  Addressed    Hepatitis A vaccine  Aged Out    Hepatitis B vaccine  Aged Out    Hib vaccine  Aged Out    Meningococcal (ACWY) vaccine  Aged Out       No results found for: LABA1C          ( goal A1C is < 7)   No results found for: LABMICR  No results found for: LDLCHOLESTEROL, LDLCALC    (goal LDL is <100)   No results found for: AST, ALT, BUN  BP Readings from Last 3 Encounters:   07/10/20 120/70   06/22/20 130/70   11/26/19 110/78          (goal 120/80)    All Future Testing planned in [facility]  Lab Frequency Next Occurrence               Patient Active Problem List:     Essential hypertension     HTN (hypertension)

## 2020-09-02 ENCOUNTER — OFFICE VISIT (OUTPATIENT)
Dept: FAMILY MEDICINE CLINIC | Age: 85
End: 2020-09-02
Payer: MEDICARE

## 2020-09-02 VITALS
TEMPERATURE: 97 F | SYSTOLIC BLOOD PRESSURE: 116 MMHG | BODY MASS INDEX: 20.49 KG/M2 | OXYGEN SATURATION: 98 % | HEART RATE: 70 BPM | DIASTOLIC BLOOD PRESSURE: 76 MMHG | WEIGHT: 108.4 LBS

## 2020-09-02 PROCEDURE — G8400 PT W/DXA NO RESULTS DOC: HCPCS | Performed by: INTERNAL MEDICINE

## 2020-09-02 PROCEDURE — 1090F PRES/ABSN URINE INCON ASSESS: CPT | Performed by: INTERNAL MEDICINE

## 2020-09-02 PROCEDURE — 1036F TOBACCO NON-USER: CPT | Performed by: INTERNAL MEDICINE

## 2020-09-02 PROCEDURE — 1123F ACP DISCUSS/DSCN MKR DOCD: CPT | Performed by: INTERNAL MEDICINE

## 2020-09-02 PROCEDURE — G8420 CALC BMI NORM PARAMETERS: HCPCS | Performed by: INTERNAL MEDICINE

## 2020-09-02 PROCEDURE — 4040F PNEUMOC VAC/ADMIN/RCVD: CPT | Performed by: INTERNAL MEDICINE

## 2020-09-02 PROCEDURE — 99213 OFFICE O/P EST LOW 20 MIN: CPT | Performed by: INTERNAL MEDICINE

## 2020-09-02 PROCEDURE — G8428 CUR MEDS NOT DOCUMENT: HCPCS | Performed by: INTERNAL MEDICINE

## 2020-09-02 RX ORDER — CEPHALEXIN 500 MG/1
500 CAPSULE ORAL 3 TIMES DAILY
Qty: 21 CAPSULE | Refills: 0 | Status: SHIPPED | OUTPATIENT
Start: 2020-09-02 | End: 2020-09-09

## 2020-09-02 ASSESSMENT — ENCOUNTER SYMPTOMS
VOMITING: 0
SHORTNESS OF BREATH: 0
NAUSEA: 0
CHOKING: 0
BLOOD IN STOOL: 0
COUGH: 0
WHEEZING: 0
DIARRHEA: 0
ANAL BLEEDING: 0
ABDOMINAL PAIN: 0
CONSTIPATION: 0
CHEST TIGHTNESS: 0

## 2020-09-02 NOTE — PROGRESS NOTES
Subjective:       Patient ID:     Breann Reza is a 81 y.o. female who presents for   Chief Complaint   Patient presents with    Finger Injury     was swollen, red and hot       HPI:  Nursing note reviewed and discussed with patient. R ring finger has been swollen for two days, started after she had been doing some yardwork and throwing sticks and leaves into a bag. She felt a sting and later it swelled up. They have been using OTC creams without relief. Much worse at night. Patient's medications, allergies, past medical, surgical, social and family histories were reviewed and updated as appropriate. Past Medical History:   Diagnosis Date    Hypertension     Unspecified essential hypertension 10/5/2012     No past surgical history on file. Social History     Tobacco Use    Smoking status: Never Smoker    Smokeless tobacco: Never Used   Substance Use Topics    Alcohol use: No      Patient Active Problem List   Diagnosis    Essential hypertension    HTN (hypertension)         Prior to Visit Medications    Medication Sig Taking? Authorizing Provider   amLODIPine (NORVASC) 5 MG tablet take 1 tablet by mouth once daily  Carol Loza MD   cetirizine (ZYRTEC ALLERGY) 10 MG tablet Take 1 tablet by mouth daily  SANDER Durand CNP   ranitidine (ZANTAC) 150 MG tablet Take 1 tablet by mouth 2 times daily for 14 days  Patient not taking: Reported on 6/22/2020  Carol Loza MD   Multiple Vitamins-Minerals (EYE VITAMINS & MINERALS PO) Take by mouth 2 times daily  Historical Provider, MD   timolol (TIMOPTIC) 0.5 % ophthalmic solution As directed  [facility] Juan Martinez DO     Review of Systems  Review of Systems   Constitutional: Negative for fatigue, fever and unexpected weight change. Respiratory: Negative for cough, choking, chest tightness, shortness of breath and wheezing. Cardiovascular: Negative for chest pain, palpitations and leg swelling.    Gastrointestinal: Negative for abdominal pain, anal bleeding, blood in stool, constipation, diarrhea, nausea and vomiting. Endocrine: Negative. Musculoskeletal: Negative for joint swelling and myalgias. Skin: Negative. Neurological: Negative for dizziness. Psychiatric/Behavioral: Negative for sleep disturbance. All other systems reviewed and are negative. Objective:       Physical Exam:  /76 (Site: Left Upper Arm, Position: Sitting, Cuff Size: Small Adult)   Pulse 70   Temp 97 °F (36.1 °C) (Temporal)   Wt 108 lb 6.4 oz (49.2 kg)   SpO2 98%   BMI 20.49 kg/m²   Physical Exam  Vitals signs and nursing note reviewed. Constitutional:       General: She is not in acute distress. Appearance: Normal appearance. Musculoskeletal:      Right hand: She exhibits tenderness and swelling. She exhibits normal range of motion, no bony tenderness, normal two-point discrimination, normal capillary refill, no deformity and no laceration. Hands:       Comments: Central punctum with surrounding induration and erythema of R 4th finger on palmar surface    Neurological:      Mental Status: She is alert. Data Review         Assessment/Plan:      1. Cellulitis of finger of right hand  Warm compresses advised   - cephALEXin (KEFLEX) 500 MG capsule; Take 1 capsule by mouth 3 times daily for 7 days  Dispense: 21 capsule; Refill: 0           There are no preventive care reminders to display for this patient.     Electronically signed by Ashley Pabon MD on 9/2/2020 at 3:16 PM

## 2020-09-02 NOTE — PROGRESS NOTES
She was out working in the yard and thinks she got bite by a bug/spider on he RT ring finger. . She states this happen 2 days ago. It was red, hot and swollen . Today is better but it feels weird.

## 2021-02-05 DIAGNOSIS — I10 ESSENTIAL HYPERTENSION: ICD-10-CM

## 2021-02-08 RX ORDER — AMLODIPINE BESYLATE 5 MG/1
TABLET ORAL
Qty: 90 TABLET | Refills: 1 | Status: SHIPPED | OUTPATIENT
Start: 2021-02-08 | End: 2021-08-16

## 2021-02-08 NOTE — TELEPHONE ENCOUNTER
Last visit: 09/02/2020  Last Med refill: 11/17/2020  Does patient have enough medication for 72 hours: No:     Next Visit Date:  No future appointments.     Health Maintenance   Topic Date Due    COVID-19 Vaccine (1 of 2) 04/29/1951    Shingles Vaccine (1 of 2) 04/29/1986    Pneumococcal 65+ years Vaccine (1 of 1 - PPSV23) 04/29/2001    Annual Wellness Visit (AWV)  06/19/2019    DTaP/Tdap/Td vaccine (1 - Tdap) 09/09/2026 (Originally 4/29/1954)    Flu vaccine (1) 09/09/2026 (Originally 9/1/2020)    DEXA (modify frequency per FRAX score)  Addressed    Hepatitis A vaccine  Aged Out    Hepatitis B vaccine  Aged Out    Hib vaccine  Aged Out    Meningococcal (ACWY) vaccine  Aged Out       No results found for: LABA1C          ( goal A1C is < 7)   No results found for: LABMICR  No results found for: LDLCHOLESTEROL, LDLCALC    (goal LDL is <100)   No results found for: AST, ALT, BUN  BP Readings from Last 3 Encounters:   09/02/20 116/76   07/10/20 120/70   06/22/20 130/70          (goal 120/80)    All Future Testing planned in [facility]  Lab Frequency Next Occurrence               Patient Active Problem List:     HTN (hypertension)

## 2021-08-14 DIAGNOSIS — I10 ESSENTIAL HYPERTENSION: ICD-10-CM

## 2021-08-16 RX ORDER — AMLODIPINE BESYLATE 5 MG/1
TABLET ORAL
Qty: 30 TABLET | Refills: 0 | Status: SHIPPED | OUTPATIENT
Start: 2021-08-16 | End: 2021-09-28 | Stop reason: SDUPTHER

## 2021-08-16 NOTE — TELEPHONE ENCOUNTER
Last visit: 9/2/20  Last Med refill: 2/8/21  Does patient have enough medication for 72 hours: No:     PATIENT NEEDS APPT    Next Visit Date:  No future appointments.     Health Maintenance   Topic Date Due    Shingles Vaccine (1 of 2) Never done    Pneumococcal 65+ years Vaccine (1 of 1 - PPSV23) Never done    Annual Wellness Visit (AWV)  Never done    DTaP/Tdap/Td vaccine (1 - Tdap) 09/09/2026 (Originally 4/29/1954)    Flu vaccine (1) 09/09/2026 (Originally 9/1/2021)    COVID-19 Vaccine  Completed    DEXA (modify frequency per FRAX score)  Addressed    Hepatitis A vaccine  Aged Out    Hepatitis B vaccine  Aged Out    Hib vaccine  Aged Out    Meningococcal (ACWY) vaccine  Aged Out       No results found for: LABA1C          ( goal A1C is < 7)   No results found for: LABMICR  No results found for: LDLCHOLESTEROL, LDLCALC    (goal LDL is <100)   No results found for: AST, ALT, BUN  BP Readings from Last 3 Encounters:   09/02/20 116/76   07/10/20 120/70   06/22/20 130/70          (goal 120/80)    All Future Testing planned in [facility]  Lab Frequency Next Occurrence               Patient Active Problem List:     HTN (hypertension)

## 2021-09-13 DIAGNOSIS — I10 ESSENTIAL HYPERTENSION: ICD-10-CM

## 2021-09-13 RX ORDER — AMLODIPINE BESYLATE 5 MG/1
TABLET ORAL
Qty: 30 TABLET | Refills: 0 | OUTPATIENT
Start: 2021-09-13

## 2021-09-28 DIAGNOSIS — I10 ESSENTIAL HYPERTENSION: ICD-10-CM

## 2021-09-28 NOTE — TELEPHONE ENCOUNTER
Last visit: 9/2020  Last Med refill: 08/16/2021  Does patient have enough medication for 72 hours: No:     Next Visit Date:  Future Appointments   Date Time Provider Clarice Eunice   12/15/2021  8:45 AM Carol Lopez  [address] Maintenance   Topic Date Due    Shingles Vaccine (1 of 2) Never done    Pneumococcal 65+ years Vaccine (1 of 1 - PPSV23) Never done    Annual Wellness Visit (AWV)  Never done    DTaP/Tdap/Td vaccine (1 - Tdap) 09/09/2026 (Originally 4/29/1954)    Flu vaccine (1) 09/09/2026 (Originally 9/1/2021)    COVID-19 Vaccine  Completed    DEXA (modify frequency per FRAX score)  Addressed    Hepatitis A vaccine  Aged Out    Hepatitis B vaccine  Aged Out    Hib vaccine  Aged Out    Meningococcal (ACWY) vaccine  Aged Out       No results found for: LABA1C          ( goal A1C is < 7)   No results found for: LABMICR  No results found for: LDLCHOLESTEROL, LDLCALC    (goal LDL is <100)   No results found for: AST, ALT, BUN  BP Readings from Last 3 Encounters:   09/02/20 116/76   07/10/20 120/70   06/22/20 130/70          (goal 120/80)    All Future Testing planned in [facility]  Lab Frequency Next Occurrence               Patient Active Problem List:     HTN (hypertension)

## 2021-09-29 RX ORDER — AMLODIPINE BESYLATE 5 MG/1
TABLET ORAL
Qty: 90 TABLET | Refills: 0 | Status: SHIPPED | OUTPATIENT
Start: 2021-09-29 | End: 2021-12-15 | Stop reason: SDUPTHER

## 2021-12-15 ENCOUNTER — OFFICE VISIT (OUTPATIENT)
Dept: FAMILY MEDICINE CLINIC | Age: 86
End: 2021-12-15
Payer: MEDICARE

## 2021-12-15 VITALS
HEIGHT: 60 IN | OXYGEN SATURATION: 99 % | DIASTOLIC BLOOD PRESSURE: 76 MMHG | TEMPERATURE: 97.6 F | BODY MASS INDEX: 21.4 KG/M2 | SYSTOLIC BLOOD PRESSURE: 120 MMHG | HEART RATE: 87 BPM | WEIGHT: 109 LBS

## 2021-12-15 DIAGNOSIS — I10 ESSENTIAL HYPERTENSION: ICD-10-CM

## 2021-12-15 PROCEDURE — 1036F TOBACCO NON-USER: CPT | Performed by: INTERNAL MEDICINE

## 2021-12-15 PROCEDURE — 1090F PRES/ABSN URINE INCON ASSESS: CPT | Performed by: INTERNAL MEDICINE

## 2021-12-15 PROCEDURE — G8484 FLU IMMUNIZE NO ADMIN: HCPCS | Performed by: INTERNAL MEDICINE

## 2021-12-15 PROCEDURE — 99213 OFFICE O/P EST LOW 20 MIN: CPT | Performed by: INTERNAL MEDICINE

## 2021-12-15 PROCEDURE — 4040F PNEUMOC VAC/ADMIN/RCVD: CPT | Performed by: INTERNAL MEDICINE

## 2021-12-15 PROCEDURE — 1123F ACP DISCUSS/DSCN MKR DOCD: CPT | Performed by: INTERNAL MEDICINE

## 2021-12-15 PROCEDURE — G8427 DOCREV CUR MEDS BY ELIG CLIN: HCPCS | Performed by: INTERNAL MEDICINE

## 2021-12-15 PROCEDURE — G8420 CALC BMI NORM PARAMETERS: HCPCS | Performed by: INTERNAL MEDICINE

## 2021-12-15 PROCEDURE — G8400 PT W/DXA NO RESULTS DOC: HCPCS | Performed by: INTERNAL MEDICINE

## 2021-12-15 RX ORDER — AMLODIPINE BESYLATE 5 MG/1
TABLET ORAL
Qty: 90 TABLET | Refills: 1 | Status: SHIPPED | OUTPATIENT
Start: 2021-12-15 | End: 2022-06-15 | Stop reason: SDUPTHER

## 2021-12-15 SDOH — ECONOMIC STABILITY: FOOD INSECURITY: WITHIN THE PAST 12 MONTHS, THE FOOD YOU BOUGHT JUST DIDN'T LAST AND YOU DIDN'T HAVE MONEY TO GET MORE.: NEVER TRUE

## 2021-12-15 SDOH — ECONOMIC STABILITY: FOOD INSECURITY: WITHIN THE PAST 12 MONTHS, YOU WORRIED THAT YOUR FOOD WOULD RUN OUT BEFORE YOU GOT MONEY TO BUY MORE.: NEVER TRUE

## 2021-12-15 ASSESSMENT — ENCOUNTER SYMPTOMS
CHOKING: 0
VOMITING: 0
NAUSEA: 0
COUGH: 0
CONSTIPATION: 0
ANAL BLEEDING: 0
WHEEZING: 0
CHEST TIGHTNESS: 0
BLOOD IN STOOL: 0
DIARRHEA: 0
ABDOMINAL PAIN: 0
BLURRED VISION: 0
ORTHOPNEA: 0
SHORTNESS OF BREATH: 0

## 2021-12-15 ASSESSMENT — PATIENT HEALTH QUESTIONNAIRE - PHQ9
SUM OF ALL RESPONSES TO PHQ QUESTIONS 1-9: 2
SUM OF ALL RESPONSES TO PHQ QUESTIONS 1-9: 2
1. LITTLE INTEREST OR PLEASURE IN DOING THINGS: 1
2. FEELING DOWN, DEPRESSED OR HOPELESS: 1
SUM OF ALL RESPONSES TO PHQ QUESTIONS 1-9: 2
SUM OF ALL RESPONSES TO PHQ9 QUESTIONS 1 & 2: 2

## 2021-12-15 ASSESSMENT — SOCIAL DETERMINANTS OF HEALTH (SDOH): HOW HARD IS IT FOR YOU TO PAY FOR THE VERY BASICS LIKE FOOD, HOUSING, MEDICAL CARE, AND HEATING?: NOT VERY HARD

## 2021-12-15 ASSESSMENT — VISUAL ACUITY: OU: 1

## 2021-12-15 NOTE — PROGRESS NOTES
Subjective:       Patient ID:     Chavez Ashford is a 81 y.o. female who presents for   Chief Complaint   Patient presents with    Hypertension       HPI:  Nursing note reviewed and discussed with patient. Hypertension  This is a chronic problem. The current episode started more than 1 year ago. The problem is unchanged. The problem is controlled. Pertinent negatives include no anxiety, blurred vision, chest pain, headaches, malaise/fatigue, neck pain, orthopnea, palpitations, peripheral edema, PND, shortness of breath or sweats. There are no associated agents to hypertension. Risk factors for coronary artery disease include post-menopausal state and sedentary lifestyle. Past treatments include calcium channel blockers. The current treatment provides significant improvement. There are no compliance problems. Patient's medications, allergies, past medical, surgical, social and family histories were reviewed and updated as appropriate. Past Medical History:   Diagnosis Date    Hypertension     Unspecified essential hypertension 10/5/2012     No past surgical history on file. Social History     Tobacco Use    Smoking status: Never Smoker    Smokeless tobacco: Never Used   Substance Use Topics    Alcohol use: No      Patient Active Problem List   Diagnosis    HTN (hypertension)         Prior to Visit Medications    Medication Sig Taking? Authorizing Provider   Glycerin-Polysorbate 80 (REFRESH DRY EYE THERAPY OP) Apply to eye Yes Historical Provider, MD   amLODIPine (NORVASC) 5 MG tablet Take 1 table by mouth once daily Yes Francisco Suarez MD   Multiple Vitamins-Minerals (EYE VITAMINS & MINERALS PO) Take by mouth 2 times daily Yes Historical Provider, MD   timolol (TIMOPTIC) 0.5 % ophthalmic solution As directed Yes Klarissa Martinez, DO     Review of Systems  Review of Systems   Constitutional: Negative for fatigue, fever, malaise/fatigue and unexpected weight change.    Eyes: Negative for blurred vision. Respiratory: Negative for cough, choking, chest tightness, shortness of breath and wheezing. Cardiovascular: Negative for chest pain, palpitations, orthopnea, leg swelling and PND. Gastrointestinal: Negative for abdominal pain, anal bleeding, blood in stool, constipation, diarrhea, nausea and vomiting. Endocrine: Negative. Musculoskeletal: Negative for joint swelling, myalgias and neck pain. Skin: Negative. Neurological: Negative for dizziness and headaches. Psychiatric/Behavioral: Negative for sleep disturbance. All other systems reviewed and are negative. Objective:       Physical Exam:  /76 (Site: Left Upper Arm, Position: Sitting, Cuff Size: Medium Adult)   Pulse 87   Temp 97.6 °F (36.4 °C) (Temporal)   Ht 5' (1.524 m)   Wt 109 lb (49.4 kg)   SpO2 99%   BMI 21.29 kg/m²   Physical Exam  Vitals and nursing note reviewed. Constitutional:       General: She is not in acute distress. Appearance: She is well-developed. She is not ill-appearing. Eyes:      General: Lids are normal. Vision grossly intact. Cardiovascular:      Rate and Rhythm: Normal rate and regular rhythm. Heart sounds: Normal heart sounds, S1 normal and S2 normal. No murmur heard. No friction rub. No gallop. Pulmonary:      Effort: Pulmonary effort is normal. No respiratory distress. Breath sounds: Normal breath sounds. No wheezing. Abdominal:      General: Bowel sounds are normal.      Palpations: Abdomen is soft. There is no mass. Tenderness: There is no abdominal tenderness. There is no guarding. Musculoskeletal:         General: Normal range of motion. Skin:     General: Skin is warm and dry. Capillary Refill: Capillary refill takes less than 2 seconds. Neurological:      General: No focal deficit present. Mental Status: She is alert and oriented to person, place, and time. Data Review  not applicable       Assessment/Plan:      1.  Essential hypertension  - amLODIPine (NORVASC) 5 MG tablet; Take 1 table by mouth once daily  Dispense: 90 tablet;  Refill: 1  Declines all labs and vaccines - refuses to do anything that involves needles and/or blood            Health Maintenance Due   Topic Date Due    Annual Wellness Visit (AWV)  11/26/2020       Electronically signed by Radha Brantley MD on 12/15/2021 at 8:53 AM

## 2021-12-31 DIAGNOSIS — I10 ESSENTIAL HYPERTENSION: ICD-10-CM

## 2022-01-03 RX ORDER — AMLODIPINE BESYLATE 5 MG/1
TABLET ORAL
Qty: 90 TABLET | Refills: 1 | OUTPATIENT
Start: 2022-01-03

## 2022-06-15 ENCOUNTER — OFFICE VISIT (OUTPATIENT)
Dept: FAMILY MEDICINE CLINIC | Age: 87
End: 2022-06-15
Payer: MEDICARE

## 2022-06-15 VITALS
DIASTOLIC BLOOD PRESSURE: 76 MMHG | HEIGHT: 60 IN | BODY MASS INDEX: 20.65 KG/M2 | HEART RATE: 78 BPM | OXYGEN SATURATION: 98 % | SYSTOLIC BLOOD PRESSURE: 110 MMHG | WEIGHT: 105.2 LBS | TEMPERATURE: 97.9 F

## 2022-06-15 DIAGNOSIS — Z13.29 SCREENING FOR THYROID DISORDER: ICD-10-CM

## 2022-06-15 DIAGNOSIS — Z13.220 SCREENING, LIPID: ICD-10-CM

## 2022-06-15 DIAGNOSIS — G31.84 MILD COGNITIVE IMPAIRMENT: ICD-10-CM

## 2022-06-15 DIAGNOSIS — E55.9 VITAMIN D DEFICIENCY: ICD-10-CM

## 2022-06-15 DIAGNOSIS — Z00.00 MEDICARE ANNUAL WELLNESS VISIT, SUBSEQUENT: Primary | ICD-10-CM

## 2022-06-15 DIAGNOSIS — I10 ESSENTIAL HYPERTENSION: ICD-10-CM

## 2022-06-15 DIAGNOSIS — Z13.0 SCREENING, ANEMIA, DEFICIENCY, IRON: ICD-10-CM

## 2022-06-15 PROCEDURE — 1123F ACP DISCUSS/DSCN MKR DOCD: CPT | Performed by: INTERNAL MEDICINE

## 2022-06-15 PROCEDURE — G0439 PPPS, SUBSEQ VISIT: HCPCS | Performed by: INTERNAL MEDICINE

## 2022-06-15 RX ORDER — AMLODIPINE BESYLATE 5 MG/1
TABLET ORAL
Qty: 90 TABLET | Refills: 1 | Status: SHIPPED | OUTPATIENT
Start: 2022-06-15

## 2022-06-15 ASSESSMENT — PATIENT HEALTH QUESTIONNAIRE - PHQ9
SUM OF ALL RESPONSES TO PHQ QUESTIONS 1-9: 2
SUM OF ALL RESPONSES TO PHQ QUESTIONS 1-9: 2
1. LITTLE INTEREST OR PLEASURE IN DOING THINGS: 1
2. FEELING DOWN, DEPRESSED OR HOPELESS: 1
SUM OF ALL RESPONSES TO PHQ9 QUESTIONS 1 & 2: 2
SUM OF ALL RESPONSES TO PHQ QUESTIONS 1-9: 2
SUM OF ALL RESPONSES TO PHQ QUESTIONS 1-9: 2

## 2022-06-15 ASSESSMENT — LIFESTYLE VARIABLES: HOW OFTEN DO YOU HAVE A DRINK CONTAINING ALCOHOL: NEVER

## 2022-06-15 NOTE — PATIENT INSTRUCTIONS
Your labs have been ordered today as fasting labs - this means you will need to fast overnight for at least 8 hours before you come in to get the blood drawn. You may have water, black tea or coffee without sugar or creamer prior to coming in for labs. Labs due around 6/29/22. Your lab results will be released to your [employer] account as they are resulted by the lab. I will send you a message regarding your results once I have had a chance to review them, usually within a couple of days, so if you have not heard from me it means I'm either waiting for some results, or that I have not had a moment to review the results. Personalized Preventive Plan for Amelie Lord - 6/15/2022  Medicare offers a range of preventive health benefits. Some of the tests and screenings are paid in full while other may be subject to a deductible, co-insurance, and/or copay. Some of these benefits include a comprehensive review of your medical history including lifestyle, illnesses that may run in your family, and various assessments and screenings as appropriate. After reviewing your medical record and screening and assessments performed today your provider may have ordered immunizations, labs, imaging, and/or referrals for you. A list of these orders (if applicable) as well as your Preventive Care list are included within your After Visit Summary for your review. Other Preventive Recommendations:    · A preventive eye exam performed by an eye specialist is recommended every 1-2 years to screen for glaucoma; cataracts, macular degeneration, and other eye disorders. · A preventive dental visit is recommended every 6 months. · Try to get at least 150 minutes of exercise per week or 10,000 steps per day on a pedometer . · Order or download the FREE \"Exercise & Physical Activity: Your Everyday Guide\" from [employer]. Call 7-380.793.1789 or search [employer] online.   · You need 9193-3418 mg of calcium and 1204-9776 IU of vitamin D per day. It is possible to meet your calcium requirement with diet alone, but a vitamin D supplement is usually necessary to meet this goal.  · When exposed to the sun, use a sunscreen that protects against both UVA and UVB radiation with an SPF of 30 or greater. Reapply every 2 to 3 hours or after sweating, drying off with a towel, or swimming. · Always wear a seat belt when traveling in a car. Always wear a helmet when riding a bicycle or motorcycle.

## 2022-06-15 NOTE — PROGRESS NOTES
Medicare Annual Wellness Visit    Mylene Hidalgo is here for Medicare AWV    Assessment & Plan   Medicare annual wellness visit, subsequent  Essential hypertension  -     amLODIPine (NORVASC) 5 MG tablet; Take 1 table by mouth once daily, Disp-90 tablet, R-1Normal  Mild cognitive impairment  -     Vitamin B12 & Folate; Future  Screening, anemia, deficiency, iron  -     CBC with Auto Differential; Future  Screening, lipid  -     Comprehensive Metabolic Panel; Future  -     Lipid, Fasting; Future  Screening for thyroid disorder  -     TSH with Reflex; Future  Vitamin D deficiency  -     Vitamin D 25 Hydroxy; Future         Continues to decline labs due to fear of needles - she has not had any labs in over a decade at least   Very reluctant to discuss testing - very insistent that she is \"getting older and doing okay\"   >15 min counseling, agrees to come in for labs in two weeks time     Recommendations for Preventive Services Due: see orders and patient instructions/AVS.  Recommended screening schedule for the next 5-10 years is provided to the patient in written form: see Patient Instructions/AVS.     Return in 6 months (on 12/15/2022) for Medicare Annual Wellness Visit in 1 year. Subjective   The following acute and/or chronic problems were also addressed today:  Hypertension-tolerating current regimen without chest pain, palpitations, dizziness, peripheral edema, dyspnea on exertion, orthopnea, paroxysmal nocturnal dyspnea. Mostly compliant with diet recommendations for low salt diet, tries to limit greasy/cheesy/fried foods, not very compliant with exercise recommendations. Cardiovascular risk factors: advanced age (older than 54 for men, 72 for women) and hypertension      Patient's complete Health Risk Assessment and screening values have been reviewed and are found in Flowsheets.  The following problems were reviewed today and where indicated follow up appointments were made and/or referrals ordered. Positive Risk Factor Screenings with Interventions:     Cognitive: Words recalled: 0 Words Recalled  Total Score Interpretation: Abnormal Mini-Cog  Did the patient refuse to take the cognition test?: No    Cognitive Impairment Interventions:  · Patient declines any further evaluation/treatment for cognitive impairment  · reviewed that this is a big change from 2019 - she had a normal screen then    · Notes she is getting old - very reluctant to discuss any thing related to testing or cognitive deterioration - able to admit that gets flustered easily e.g.- if missing an ingredient.  does most of the driving. · Remains very reluctant to do any testing - counseling provided              Hearing/Vision:  Do you or your family notice any trouble with your hearing that hasn't been managed with hearing aids?: No  Do you have difficulty driving, watching TV, or doing any of your daily activities because of your eyesight?: No  Have you had an eye exam within the past year?: (!) No  No exam data present    Hearing/Vision Interventions:  · Vision concerns:  broke her glasses yesterday - will be calling optometry to schedule visit for new glasses             Objective   Vitals:    06/15/22 0952   BP: 110/76   Pulse: 78   Temp: 97.9 °F (36.6 °C)   TempSrc: Temporal   SpO2: 98%   Weight: 105 lb 3.2 oz (47.7 kg)   Height: 5' 0.06\" (1.526 m)      Body mass index is 20.5 kg/m².         General Appearance: alert and oriented to person, place and time, well developed and well- nourished, in no acute distress  Skin: warm and dry, no rash or erythema  Head: normocephalic and atraumatic  Eyes: pupils equal, round, and reactive to light, extraocular eye movements intact, conjunctivae normal  ENT: tympanic membrane, external ear and ear canal normal bilaterally, nose without deformity, nasal mucosa and turbinates normal without polyps  Neck: supple and non-tender without mass, no thyromegaly or thyroid nodules, no cervical lymphadenopathy  Pulmonary/Chest: clear to auscultation bilaterally- no wheezes, rales or rhonchi, normal air movement, no respiratory distress  Cardiovascular: normal rate, regular rhythm, normal S1 and S2, no murmurs, rubs, clicks, or gallops, distal pulses intact, no carotid bruits  Abdomen: soft, non-tender, non-distended, normal bowel sounds, no masses or organomegaly  Extremities: no cyanosis, clubbing or edema  Musculoskeletal: normal range of motion, no joint swelling, deformity or tenderness  Neurologic: reflexes normal and symmetric, no cranial nerve deficit, gait, coordination and speech normal       No Known Allergies  Prior to Visit Medications    Medication Sig Taking?  Authorizing Provider   Glycerin-Polysorbate 80 (REFRESH DRY EYE THERAPY OP) Apply to eye Yes Historical Provider, MD   amLODIPine (NORVASC) 5 MG tablet Take 1 table by mouth once daily Yes Yury Pena MD   Multiple Vitamins-Minerals (EYE VITAMINS & MINERALS PO) Take by mouth 2 times daily Yes Historical Provider, MD   timolol (TIMOPTIC) 0.5 % ophthalmic solution As directed Yes Viola Sandhoff E Rasor, DO [facility] (Including outside providers/suppliers regularly involved in providing care):   Patient Care Team:  Yury Pena MD as PCP - General (Internal Medicine)  Yury Pena MD as PCP - [facility] Empaneled Provider     Reviewed and updated this visit:  Tobacco  Allergies  Meds  Med Hx  Surg Hx  Soc Hx  Fam Hx

## 2023-01-13 DIAGNOSIS — I10 ESSENTIAL HYPERTENSION: ICD-10-CM

## 2023-01-13 RX ORDER — AMLODIPINE BESYLATE 5 MG/1
TABLET ORAL
Qty: 90 TABLET | Refills: 1 | Status: SHIPPED | OUTPATIENT
Start: 2023-01-13

## 2023-01-13 NOTE — TELEPHONE ENCOUNTER
Last visit: 06/15/2022  Last Med refill: 10/15/2022  Does patient have enough medication for 72 hours: No:     Next Visit Date:  No future appointments.     Health Maintenance   Topic Date Due    Shingles vaccine (1 of 2) Never done    COVID-19 Vaccine (3 - Booster for Moderna series) 12/15/2023 (Originally 6/11/2021)    Pneumococcal 65+ years Vaccine (1 - PCV) 12/15/2024 (Originally 4/29/2001)    DTaP/Tdap/Td vaccine (1 - Tdap) 09/09/2026 (Originally 4/29/1954)    Flu vaccine (1) 09/09/2026 (Originally 8/1/2022)    Depression Screen  06/15/2023    Annual Wellness Visit (AWV)  06/16/2023    Hepatitis A vaccine  Aged Out    Hib vaccine  Aged Out    Meningococcal (ACWY) vaccine  Aged Out       No results found for: LABA1C          ( goal A1C is < 7)   No results found for: LABMICR  No results found for: LDLCHOLESTEROL, LDLCALC    (goal LDL is <100)   No results found for: AST, ALT, BUN, CR  BP Readings from Last 3 Encounters:   06/15/22 110/76   12/15/21 120/76   09/02/20 116/76          (goal 120/80)    All Future Testing planned in [facility]  Lab Frequency Next Occurrence   CBC with Auto Differential Once 06/29/2022   Comprehensive Metabolic Panel Once 60/08/5122   Lipid, Fasting Once 06/29/2022   TSH with Reflex Once 06/29/2022   Vitamin D 25 Hydroxy Once 06/29/2022   Vitamin B12 & Folate Once 06/29/2022               Patient Active Problem List:     Essential hypertension     Mild cognitive impairment

## 2023-03-21 NOTE — TELEPHONE ENCOUNTER
Emili Mandujano and Rabia stopped into the office in regards medications for Emili Delbert. She is not sure if using the Timoptic 0.5% or not.

## 2023-03-22 RX ORDER — TIMOLOL MALEATE 5 MG/ML
1 SOLUTION/ DROPS OPHTHALMIC DAILY
Qty: 1 EACH | Refills: 1 | Status: SHIPPED | OUTPATIENT
Start: 2023-03-22